# Patient Record
Sex: FEMALE | Race: WHITE | HISPANIC OR LATINO | Employment: FULL TIME | URBAN - METROPOLITAN AREA
[De-identification: names, ages, dates, MRNs, and addresses within clinical notes are randomized per-mention and may not be internally consistent; named-entity substitution may affect disease eponyms.]

---

## 2024-08-09 ENCOUNTER — INITIAL PRENATAL (OUTPATIENT)
Dept: OBSTETRICS AND GYNECOLOGY | Facility: CLINIC | Age: 24
End: 2024-08-09
Payer: COMMERCIAL

## 2024-08-09 VITALS — WEIGHT: 130 LBS | DIASTOLIC BLOOD PRESSURE: 61 MMHG | SYSTOLIC BLOOD PRESSURE: 108 MMHG

## 2024-08-09 DIAGNOSIS — Z11.3 SCREEN FOR SEXUALLY TRANSMITTED DISEASES: ICD-10-CM

## 2024-08-09 DIAGNOSIS — N91.2 ABSENT MENSES: Primary | ICD-10-CM

## 2024-08-09 DIAGNOSIS — Z34.00 SUPERVISION OF NORMAL FIRST PREGNANCY, ANTEPARTUM: ICD-10-CM

## 2024-08-09 DIAGNOSIS — Z12.4 SCREENING FOR MALIGNANT NEOPLASM OF CERVIX: ICD-10-CM

## 2024-08-09 DIAGNOSIS — Z31.49 PROCREATION MANAGEMENT INVESTIGATION AND TESTING: ICD-10-CM

## 2024-08-09 LAB
B-HCG UR QL: POSITIVE
EXPIRATION DATE: ABNORMAL
INTERNAL NEGATIVE CONTROL: NEGATIVE
INTERNAL POSITIVE CONTROL: POSITIVE
Lab: ABNORMAL

## 2024-08-09 RX ORDER — PNV NO.95/FERROUS FUM/FOLIC AC 28MG-0.8MG
1 TABLET ORAL DAILY
Qty: 30 TABLET | Refills: 11 | Status: SHIPPED | OUTPATIENT
Start: 2024-08-09 | End: 2024-09-08

## 2024-08-10 LAB
AMPHETAMINES UR QL SCN: NEGATIVE NG/ML
BARBITURATES UR QL SCN: NEGATIVE NG/ML
BENZODIAZ UR QL SCN: NEGATIVE NG/ML
BZE UR QL SCN: NEGATIVE NG/ML
CANNABINOIDS UR QL SCN: NEGATIVE NG/ML
CREAT UR-MCNC: 96.4 MG/DL (ref 20–300)
LABORATORY COMMENT REPORT: NORMAL
METHADONE UR QL SCN: NEGATIVE NG/ML
OPIATES UR QL SCN: NEGATIVE NG/ML
OXYCODONE+OXYMORPHONE UR QL SCN: NEGATIVE NG/ML
PCP UR QL: NEGATIVE NG/ML
PH UR: 5.8 [PH] (ref 4.5–8.9)
PROPOXYPH UR QL SCN: NEGATIVE NG/ML

## 2024-08-11 LAB
BACTERIA UR CULT: NO GROWTH
BACTERIA UR CULT: NORMAL

## 2024-08-12 LAB
ABO GROUP BLD: NORMAL
BASOPHILS # BLD AUTO: 0 X10E3/UL (ref 0–0.2)
BASOPHILS NFR BLD AUTO: 0 %
BLD GP AB SCN SERPL QL: NEGATIVE
EOSINOPHIL # BLD AUTO: 0.1 X10E3/UL (ref 0–0.4)
EOSINOPHIL NFR BLD AUTO: 1 %
ERYTHROCYTE [DISTWIDTH] IN BLOOD BY AUTOMATED COUNT: 12.2 % (ref 11.7–15.4)
HBV SURFACE AG SERPL QL IA: NEGATIVE
HCT VFR BLD AUTO: 38 % (ref 34–46.6)
HCV AB SERPL QL IA: NORMAL
HCV IGG SERPL QL IA: NON REACTIVE
HGB A MFR BLD ELPH: 97.5 % (ref 96.4–98.8)
HGB A2 MFR BLD ELPH: 2.5 % (ref 1.8–3.2)
HGB BLD-MCNC: 12.6 G/DL (ref 11.1–15.9)
HGB F MFR BLD ELPH: 0 % (ref 0–2)
HGB FRACT BLD-IMP: NORMAL
HGB S MFR BLD ELPH: 0 %
HIV 1+2 AB+HIV1 P24 AG SERPL QL IA: NON REACTIVE
IMM GRANULOCYTES # BLD AUTO: 0 X10E3/UL (ref 0–0.1)
IMM GRANULOCYTES NFR BLD AUTO: 0 %
LYMPHOCYTES # BLD AUTO: 2.6 X10E3/UL (ref 0.7–3.1)
LYMPHOCYTES NFR BLD AUTO: 25 %
MCH RBC QN AUTO: 29.9 PG (ref 26.6–33)
MCHC RBC AUTO-ENTMCNC: 33.2 G/DL (ref 31.5–35.7)
MCV RBC AUTO: 90 FL (ref 79–97)
MONOCYTES # BLD AUTO: 0.7 X10E3/UL (ref 0.1–0.9)
MONOCYTES NFR BLD AUTO: 6 %
NEUTROPHILS # BLD AUTO: 7 X10E3/UL (ref 1.4–7)
NEUTROPHILS NFR BLD AUTO: 68 %
PLATELET # BLD AUTO: 338 X10E3/UL (ref 150–450)
RBC # BLD AUTO: 4.21 X10E6/UL (ref 3.77–5.28)
RH BLD: POSITIVE
RUBV IGG SERPL IA-ACNC: 1.03 INDEX
TREPONEMA PALLIDUM IGG+IGM AB [PRESENCE] IN SERUM OR PLASMA BY IMMUNOASSAY: NON REACTIVE
WBC # BLD AUTO: 10.4 X10E3/UL (ref 3.4–10.8)

## 2024-08-12 NOTE — PROGRESS NOTES
"Chief Complaint  Initial Prenatal Visit (LMP sometime in May?)    Subjective        Koko Aguilar presents to [facility]  History of Present Illness  Patient here for initial prenatal visit.  She has uncertain last menstrual period.  She says was \"sometime in May\" but cannot narrow down any further than that.  She denies recent vaginal bleeding.  She denies significant abdominal or pelvic pain.  She is having some mild nausea but no emesis.    Menstrual History:  OB History          2    Para        Term                AB   1    Living             SAB   1    IAB        Ectopic        Molar        Multiple        Live Births                     Patient's last menstrual period was 2024 (approximate).     History reviewed. No pertinent past medical history.    History reviewed. No pertinent surgical history.    Social History     Tobacco Use    Smokeless tobacco: Never   Substance Use Topics    Alcohol use: Never    Drug use: Never     Family History   Problem Relation Age of Onset    Breast cancer Neg Hx     Ovarian cancer Neg Hx     Uterine cancer Neg Hx     Colon cancer Neg Hx      No current outpatient medications on file prior to visit.     No current facility-administered medications on file prior to visit.     No Known Allergies    Review of systems:  Constitutional: No fevers, chills, sweats   Eye: No recent visual problems, denies blurry vision   HEENT: No ear pain, nasal congestion, sore throat, voice changes   Respiratory: No shortness of breath, cough, pain on breathing   Cardiovascular: No Chest pain, palpitations   Gastrointestinal: No nausea, vomiting, diarrhea, constipation   Genitourinary: No hematuria, dysuria, lesions on genitalia   Hema/Lymph: Negative for bruising, no edema   Endocrine: Negative for excessive thirst, excessive hunger, heat or cold intolerance   Musculoskeletal: No joint pain, muscle pain, decreased range of motion "   Integumentary: No rash, pruritus, abrasions, lesions   Neurologic: No weakness, numbness, headaches   Psychiatric: No anxiety, depression, mood changes       Objective   Vital Signs:  /61   Wt 59 kg (130 lb)   There is no height or weight on file to calculate BMI.             Physical Exam   Exam performed in the presence of a female chaperone  Patient has provided verbal consent to proceed with exam.    Gen: No acute distress, awake and oriented times three  HENT: Normocephalic, atraumatic, Moist mucous membranes  Eyes: PERRLA, EOMI  Lungs: Normal work of breathing, lungs clear bilaterally  Abdomen: soft, nontender, no masses or hernia, non distended, normoactive bowel sounds, fetal heart tones 160 bpm  Normal external female genitalia, no lesions  Urethra: Normal meatus, no caruncle  Bladder: nontender  Vagina: No blood or discharge  Cervix: No cervical motion tenderness, no lesions, no active bleeding, nonfriable  Uterus: Anteverted, about 10-12 weeks size, nontender  Adnexa: No masses or tenderness  External anal exam: Normal appearance, no lesions or hemorrhoids  Rectal: Deferred  Skin: Warm and dry, no rashes  Psych: Good judgement and insight, normal affect and mood  Neuro: CN 2-12 intact, no gross deficits    Result Review :            Component  Ref Range & Units today   HCG, Urine, QL  Negative Positive Abnormal    Lot Number [id number]   Internal Positive Control  Positive, Passed Positive   Internal Negative Control  Negative, Passed Negative       Ultrasound today:  Findings:  There is a live single intrauterine pregnancy identified.  Intrauterine gestational sac is seen with a normal-appearing yolk sac.  There is a single fetal pole identified with a crown-rump length of 4.71 cm, consistent with 11 weeks and 4 days  Fetal cardiac activity is detected measuring 163 bpm    Left ovary: 5.31 x 4.49 x 4.86 cm, volume 60.670 cm cube  There is a simple thin-walled cyst measuring 3.8 x 3.7 cm  noted    Right ovary: 2.45 x 1.74 x 1.96 cm, volume 4.375 cm cube  There is no adnexal mass or cyst identified.    There is no free fluid.      Impression:  Live single uterine pregnancy measuring 11 weeks and 4 days today.  Small simple left ovarian cyst noted.           Assessment and Plan     Diagnoses and all orders for this visit:    1. Absent menses (Primary)  -     POC Pregnancy, Urine    2. Supervision of normal first pregnancy, antepartum  -     OB Panel With HIV and Treponema Palldium Ab  -     Hemoglobinopathy Fractionation Cascade  -     Urine Culture - Urine, Urine, Clean Catch  -     Urine Drug Screen - Urine, Clean Catch  -     Prenatal Vit-Fe Fumarate-FA (Prenatal Vitamin) 27-0.8 MG tablet; Take 1 tablet by mouth Daily for 30 days.  Dispense: 30 tablet; Refill: 11  -     Inheritest Core Panel (CF97,SMA,FraX) - Blood,  -     [id number] PLUS Core+SCA+ESS - Blood,  -     IGP,CtNgTv,rfx Apt HPV All    3. Screening for malignant neoplasm of cervix  -     IGP,CtNgTv,rfx Apt HPV All    4. Screen for sexually transmitted diseases  -     OB Panel With HIV and Treponema Palldium Ab  -     IGP,CtNgTv,rfx Apt HPV All    5. Procreation management investigation and testing  -     OB Panel With HIV and Treponema Palldium Ab  -     Hemoglobinopathy Fractionation Cascade  -     Inheritest Core Panel (CF97,SMA,FraX) - Blood,  -     [id number] PLUS Core+SCA+ESS - Blood,    Initial prenatal counseling performed today. Educational handouts on prenatal care provided to patient. Discussed frequency of visits, lab testing, OTC medications, physical activity, exercise, dietary restrictions, potential exposures (COVID, Zika, Toxo, etc). Hospital and call coverage system discussed. Pt is recommended to start PNV.     Routine labs today with Pap, cultures, and OB profile panel.  We discussed screening options for aneuploidy.  The risk, benefits, alternatives, limitations of testing were explained.  She verbalized understanding  and wishes to proceed with NIPT today.    Return to the office in 4 weeks           Follow Up     Return in about 4 weeks (around 9/6/2024), or OB FU.  Patient was given instructions and counseling regarding her condition or for health maintenance advice. Please see specific information pulled into the AVS if appropriate.

## 2024-08-14 LAB
C TRACH RRNA CVX QL NAA+PROBE: NEGATIVE
CONV .: NORMAL
CYTOLOGIST CVX/VAG CYTO: NORMAL
CYTOLOGY CVX/VAG DOC CYTO: NORMAL
CYTOLOGY CVX/VAG DOC THIN PREP: NORMAL
DX ICD CODE: NORMAL
Lab: NORMAL
N GONORRHOEA RRNA CVX QL NAA+PROBE: NEGATIVE
OTHER STN SPEC: NORMAL
STAT OF ADQ CVX/VAG CYTO-IMP: NORMAL
T VAGINALIS RRNA SPEC QL NAA+PROBE: NEGATIVE

## 2024-08-20 ENCOUNTER — TELEPHONE (OUTPATIENT)
Dept: OBSTETRICS AND GYNECOLOGY | Facility: CLINIC | Age: 24
End: 2024-08-20
Payer: COMMERCIAL

## 2024-08-20 LAB
CITATION REF LAB TEST: ABNORMAL
CLINICAL INFO: ABNORMAL
GENDER IDENTITY: ABNORMAL
GENE DIS ANL CARRIER INTERP-IMP: ABNORMAL
GENE STUDIED ID: ABNORMAL
GENETIC SCN SPEC: ABNORMAL
LAB DIRECTOR NAME PROVIDER: ABNORMAL
Lab: ABNORMAL
REASON FOR REFERRAL (NARRATIVE): ABNORMAL
RECOMMENDATION PATIENT DOC-IMP: ABNORMAL
REF LAB TEST METHOD: ABNORMAL
SERVICE CMNT-IMP: ABNORMAL
SPECIMEN SOURCE: ABNORMAL

## 2024-08-20 NOTE — TELEPHONE ENCOUNTER
Pt aware of results. Pt wants envelope with gender for a reveal         [id number] (fetal chromosome test) was normal. Baby is a girl.

## 2024-09-06 ENCOUNTER — ROUTINE PRENATAL (OUTPATIENT)
Dept: OBSTETRICS AND GYNECOLOGY | Facility: CLINIC | Age: 24
End: 2024-09-06
Payer: COMMERCIAL

## 2024-09-06 VITALS — WEIGHT: 133.8 LBS | DIASTOLIC BLOOD PRESSURE: 63 MMHG | SYSTOLIC BLOOD PRESSURE: 101 MMHG

## 2024-09-06 DIAGNOSIS — O09.899 SUPERVISION OF OTHER HIGH RISK PREGNANCY, ANTEPARTUM: ICD-10-CM

## 2024-09-06 DIAGNOSIS — Z13.89 SCREENING FOR BLOOD OR PROTEIN IN URINE: Primary | ICD-10-CM

## 2024-09-06 DIAGNOSIS — Z15.89 MONOALLELIC MUTATION OF SMN1 GENE: ICD-10-CM

## 2024-09-06 LAB
BILIRUB BLD-MCNC: NEGATIVE MG/DL
GLUCOSE UR STRIP-MCNC: NEGATIVE MG/DL
KETONES UR QL: NEGATIVE
LEUKOCYTE EST, POC: ABNORMAL
NITRITE UR-MCNC: NEGATIVE MG/ML
PH UR: 7 [PH] (ref 5–8)
PROT UR STRIP-MCNC: NEGATIVE MG/DL
RBC # UR STRIP: NEGATIVE /UL
SP GR UR: 1.02 (ref 1–1.03)
UROBILINOGEN UR QL: ABNORMAL

## 2024-09-06 NOTE — PROGRESS NOTES
Entirety of today's encounter including patient interview, exam, and counseling performed with the aid of a medical  via the telephone.     Chief complaint: Patient here for routine OB visit.    History of present illness: No major complaints at this time.  She denies contractions, vaginal bleeding, leakage of fluid.  She reports active fetal movement.    Objective: See vital signs in the flowsheet  General: No acute distress, awake and oriented x3  Abdomen: Soft, nontender, gravid, fetal heart tones 150  Extremities: No lower extremity edema, no calf tenderness  Psychiatric: Good judgment insight, normal affect and mood  Neurologic: Cranial nerves II through XII intact, no gross deficits    Routine Prenatal on 09/06/2024   Component Date Value Ref Range Status    Glucose, UA 09/06/2024 Negative  Negative mg/dL Final    Bilirubin 09/06/2024 Negative  Negative Final    Ketones, UA 09/06/2024 Negative  Negative Final    Specific Gravity  09/06/2024 1.025  1.005 - 1.030 Final    Blood, UA 09/06/2024 Negative  Negative Final    pH, Urine 09/06/2024 7.0  5.0 - 8.0 Final    Protein, POC 09/06/2024 Negative  Negative mg/dL Final    Urobilinogen, UA 09/06/2024 0.2 E.U./dL  Normal, 0.2 E.U./dL Final    Leukocytes 09/06/2024 Trace (A)  Negative Final    Nitrite, UA 09/06/2024 Negative  Negative Final   Initial Prenatal on 08/09/2024   Component Date Value Ref Range Status    HCG, Urine, QL 08/09/2024 Positive (A)  Negative Final    Lot Number 08/09/2024 [id number]   Final    Internal Positive Control 08/09/2024 Positive  Positive, Passed Final    Internal Negative Control 08/09/2024 Negative  Negative, Passed Final    Expiration Date 08/09/2024 5,082,025   Final    Hepatitis B Surface Ag 08/09/2024 Negative  Negative Final    Hepatitis C Ab 08/09/2024 Non Reactive  Non Reactive Final    T pallidum Antibodies 08/09/2024 Non Reactive  Non Reactive Final    Rubella Antibodies, IgG 08/09/2024 1.03  Immune >0.99  index Final    Comment:                                 Non-immune       <0.90                                  Equivocal  0.90 - 0.99                                  Immune           >0.99      ABO Type 08/09/2024 O   Final    Rh Factor 08/09/2024 Positive   Final    Comment: Please note: Prior records for this patient's ABO / Rh type are not  available for additional verification.      Antibody Screen 08/09/2024 Negative  Negative Final    HIV Screen 4th Gen w/RFX (Referenc* 08/09/2024 Non Reactive  Non Reactive Final    Comment: HIV Negative  HIV-1/HIV-2 antibodies and HIV-1 p24 antigen were NOT detected.  There is no laboratory evidence of HIV infection.      WBC 08/09/2024 10.4  3.4 - 10.8 x10E3/uL Final    RBC 08/09/2024 4.21  3.77 - 5.28 x10E6/uL Final    Hemoglobin 08/09/2024 12.6  11.1 - 15.9 g/dL Final    Hematocrit 08/09/2024 38.0  34.0 - 46.6 % Final    MCV 08/09/2024 90  79 - 97 fL Final    MCH 08/09/2024 29.9  26.6 - 33.0 pg Final    MCHC 08/09/2024 33.2  31.5 - 35.7 g/dL Final    RDW 08/09/2024 12.2  11.7 - 15.4 % Final    Platelets 08/09/2024 338  150 - 450 x10E3/uL Final    Neutrophil Rel % 08/09/2024 68  Not Estab. % Final    Lymphocyte Rel % 08/09/2024 25  Not Estab. % Final    Monocyte Rel % 08/09/2024 6  Not Estab. % Final    Eosinophil Rel % 08/09/2024 1  Not Estab. % Final    Basophil Rel % 08/09/2024 0  Not Estab. % Final    Neutrophils Absolute 08/09/2024 7.0  1.4 - 7.0 x10E3/uL Final    Lymphocytes Absolute 08/09/2024 2.6  0.7 - 3.1 x10E3/uL Final    Monocytes Absolute 08/09/2024 0.7  0.1 - 0.9 x10E3/uL Final    Eosinophils Absolute 08/09/2024 0.1  0.0 - 0.4 x10E3/uL Final    Basophils Absolute 08/09/2024 0.0  0.0 - 0.2 x10E3/uL Final    Immature Granulocyte Rel % 08/09/2024 0  Not Estab. % Final    Immature Grans Absolute 08/09/2024 0.0  0.0 - 0.1 x10E3/uL Final    Hgb F Quant 08/09/2024 0.0  0.0 - 2.0 % Final    Hgb A 08/09/2024 97.5  96.4 - 98.8 % Final    Hgb A2 Quant 08/09/2024 2.5   1.8 - 3.2 % Final    Hgb S 08/09/2024 0.0  0.0 % Final    Hgb Interp. 08/09/2024 Comment   Final    Comment: Normal hemoglobin present; no hemoglobin variant or beta thalassemia  identified.  Note: Alpha thalassemia may not be detected by the Hgb Fractionation  Cascade panel. If alpha thalassemia is suspected, [facility] offers  Alpha-Thalassemia DNA Analysis (#[id number]).      Urine Culture 08/09/2024 Final report   Final    Result 1 08/09/2024 No growth   Final    Amphetamine, Urine Qual 08/09/2024 Negative  Jqjxdw=0981 ng/mL Final    Barbiturates Screen, Urine 08/09/2024 Negative  Tyuycp=754 ng/mL Final    Benzodiazepine Screen, Urine 08/09/2024 Negative  Aetkim=585 ng/mL Final    THC Screen, Urine 08/09/2024 Negative  Cutoff=20 ng/mL Final    Cocaine Screen, Urine 08/09/2024 Negative  Adodni=358 ng/mL Final    Opiate Screen, Urine 08/09/2024 Negative  Zruuve=627 ng/mL Final    Opiate test includes Codeine, Morphine, Hydromorphone, Hydrocodone.    Oxycodone/Oxymorphone, Urine 08/09/2024 Negative  Vzgesr=862 ng/mL Final    Test includes Oxycodone and Oxymorphone    Phencyclidine (PCP), Urine 08/09/2024 Negative  Cutoff=25 ng/mL Final    Methadone Screen, Urine 08/09/2024 Negative  Zkcgbf=027 ng/mL Final    Propoxyphene Screen 08/09/2024 Negative  Avvtrj=806 ng/mL Final    Creatinine, Urine 08/09/2024 96.4  20.0 - 300.0 mg/dL Final    pH, UA 08/09/2024 5.8  4.5 - 8.9 Final    Please note 08/09/2024 Comment   Final    C    Genes 08/09/2024 Comment   Final    3 genes    Ethnicity 08/09/2024 Comment   Final    Not Provided    Specimen Type: 08/09/2024 Comment   Final    Whole Blood    Indication: 08/09/2024 Comment   Final    Carrier Test / Screening    Result 08/09/2024 Comment (A)   Final    POSITIVE    Interpretation 08/09/2024 Comment   Final    Comment: Variants Detected  Disorders (Gene)   Result           Interpretation  Spinal muscular    AT RISK TO BE    At risk to be a  atrophy (SMN1)     A CARRIER : 2    silent  carrier  NM_000344.4        copies of        (2+0). Risk: MAY BE                     SMN1;            AT INCREASED RISK                     c.*3+80T>G       FOR AFFECTED                     risk variant     PREGNANCY. Genetic                     present.         counseling and                                      reproductive partner                                      carrier screening is                                      recommended.  Negative Results  Disorders (Gene)    Result         Interpretation  Cystic fibrosis     NEGATIVE       This result reduces,  (CFTR)                             but does not  NM_000492.4                        eliminate, the risk                                     to be a carrier.                                     Risk: NOT at an                                     increased risk fo                           r an                                     affected pregnancy.  Fragile X           NEGATIVE :     Not a carrier of a  syndrome (FMR1)     PCR repeats:   fragile X expansion.  NM_[id number]         21 and 39      Risk: NOT at an                                     increased risk for an                                     affected pregnancy.      RECOMMENDATIONS 08/09/2024 Comment   Final                                                                                                    Patient Gender 08/09/2024 Female   Final    Gestation 08/09/2024 Bose   Final    Fetal Fraction 08/09/2024 8%   Final    Gestational Age >9: 08/09/2024 Yes   Final    Result 08/09/2024 Negative   Final     Comments 08/09/2024 Comment   Final    Comment: This specimen showed an expected representation of  chromosome 21, 18 and 13 material. Clinical correlation is  suggested.      Approved By 08/09/2024 Comment   Final    Jordan Montaño MD, PhD, Director, [facility]    TRISOMY 21 (DOWN SYNDROME) 08/09/2024 Negative   Final    TRISOMY 18 (LAWRENCE SYNDROME) 08/09/2024  Negative   Final    TRISOMY 13 (PATAU SYNDROME) 08/09/2024 Negative   Final    FETAL SEX 08/09/2024 Comment   Final    Consistent with Female    MONOSOMY X (HAMPTON SYNDROME) 08/09/2024 Not Detected   Final    XYY (MAHARAJ SYNDROME) 08/09/2024 Not Detected   Final    XXY (KLINEFELTER SYNDROME) 08/09/2024 Not Detected   Final    XXX (TRIPLE X SYNDROME) 08/09/2024 Not Detected   Final    22Q11 DELETION (DIGEORGE) 08/09/2024 Not Detected   Final    15Q11 DELETION (PW ANGELMAN) 08/09/2024 Not Detected   Final    11Q23 DELETION (MARCELL) 08/09/2024 Not Detected   Final    8Q24 DELETION (ELOISA-GIEDION) 08/09/2024 Not Detected   Final    5P15 DELETION (Cri-du-chat) 08/09/2024 Not Detected   Final    4P16 DELETION (IBRAHIM-HIRSCHHORN) 08/09/2024 Not Detected   Final    1P36 DELETION SYNDROME 08/09/2024 Not Detected   Final    TRIOSOMY 16 08/09/2024 Not Detected   Final    TRISOMY 22 08/09/2024 Not Detected   Final                                                                                                                        Diagnosis 08/09/2024 Comment   Final    NEGATIVE FOR INTRAEPITHELIAL LESION OR MALIGNANCY.    Specimen adequacy: 08/09/2024 Comment   Final    Comment: Satisfactory for evaluation.  No endocervical component is identified.  An endocervical component is not commonly seen in the pregnant patient.      Clinician Provided ICD-10: 08/09/2024 Comment   Final    Comment: Z34.00  Z12.4  Z11.3      Performed by: 08/09/2024 Comment   Final    Shivani Ramirez, Cytotechnologist (ASCP)    . 08/09/2024 .   Final    Note: 08/09/2024 Comment   Final    Comment: The Pap smear is a screening test designed to aid in the detection of  premalignant and malignant conditions of the uterine cervix.  It is not a  diagnostic procedure and should not be used as the sole means of detecting  cervical cancer.  Both false-positive and false-negative reports do occur.      Method: 08/09/2024 Comment   Final    Comment: This liquid  based ThinPrep(R) pap test was screened with the  use of an image guided system.      Conv .conv 2024 Comment   Final    Comment: The HPV DNA reflex criteria were not met with this specimen result  therefore, no HPV testing was performed.      Chlamydia, Nuc. Acid Amp 2024 Negative  Negative Final    Gonococcus by Nucleic Acid Amp 2024 Negative  Negative Final    Trichomonas vaginosis 2024 Negative  Negative Final                     Assessment:  1.  23-year-old  2 para 0 at 15-4/7 weeks gestational age  2. SMA carrier    Plan:  1.  We discussed recent lab work with the patient.  Limitations of the testing were explained.  We discussed findings related to SMA.  Explained the genetics of this disease process.  Recommended screening of the patient's partner.  They wish for him to be tested today.  Her partner,Maxwell Caldwell, have lab work done today.  Will notify them of the results.  2.  Otherwise doing well.  Plan return to the office in 4 weeks with ultrasound for anatomy at that time.

## 2024-09-13 ENCOUNTER — TELEPHONE (OUTPATIENT)
Dept: OBSTETRICS AND GYNECOLOGY | Facility: CLINIC | Age: 24
End: 2024-09-13
Payer: COMMERCIAL

## 2024-09-13 NOTE — TELEPHONE ENCOUNTER
----- Message from Destiny DAWKINS sent at 9/13/2024  3:40 PM EDT -----  Regarding: Randa gonzalez   Contact: [phone]  [id number]. OB & US 10/11/24, seen 9/6/24. Translation    If my boyfriend's test came back well, I don't have to worry about the test that gave me a positive   Thank you.      ----- Message -----  From: Koko Aguilar  Sent: 9/13/2024   2:47 PM EDT  To: [facility]  Subject: Randa gonzalez                                     Si la prueba de mi novio salio job no tengo que preocuparme por el análisis q me juan luis positivo

## 2024-09-13 NOTE — TELEPHONE ENCOUNTER
La paciente es portadora de SMN, chetna ahora muestra que alva christianne es negativa.  Wilfrido susi, carola tiene razón, no necesita preocuparse por esta condición en chino momento. La NATE es autosómica recesiva, lo que significa que ambos padres tienen que ser portadores para que el bebé se shaheed afectado.  Wilfrido susi, alva bebé no puede verse afectado por esta condición. My Chart response to 9/13/24 My Chart message.   Detail Level: Zone Patient Specific Otc Recommendations (Will Not Stick From Patient To Patient): Continue with Wart stick with duct tape for 24hrs at a time

## 2024-10-16 ENCOUNTER — ROUTINE PRENATAL (OUTPATIENT)
Dept: OBSTETRICS AND GYNECOLOGY | Facility: CLINIC | Age: 24
End: 2024-10-16
Payer: COMMERCIAL

## 2024-10-16 VITALS — WEIGHT: 128.4 LBS | SYSTOLIC BLOOD PRESSURE: 104 MMHG | DIASTOLIC BLOOD PRESSURE: 50 MMHG

## 2024-10-16 DIAGNOSIS — Z3A.21 21 WEEKS GESTATION OF PREGNANCY: Primary | ICD-10-CM

## 2024-10-16 LAB
GLUCOSE UR STRIP-MCNC: NEGATIVE MG/DL
PROT UR STRIP-MCNC: NEGATIVE MG/DL

## 2024-10-16 RX ORDER — PRENATAL VIT/IRON FUM/FOLIC AC 27MG-0.8MG
1 TABLET ORAL DAILY
COMMUNITY
Start: 2024-10-05

## 2024-10-16 NOTE — PROGRESS NOTES
Entirety of today's encounter including patient interview, exam, and counseling performed with the aid of a medical  via the telephone.     Chief complaint: Patient here for routine OB visit.    History of present illness: No major complaints at this time.  She denies contractions, vaginal bleeding, leakage of fluid.  She reports active fetal movement.  She does report some occasional episodes of stress urinary incontinence, mostly when she sneezes.  She denies dysuria    Objective: See vital signs in the flowsheet  General: No acute distress, awake and oriented x3  Abdomen: Soft, nontender, gravid, fetal heart tones 136  Extremities: No lower extremity edema, no calf tenderness  Psychiatric: Good judgment insight, normal affect and mood  Neurologic: Cranial nerves II through XII intact, no gross deficits    Ultrasound today:   Appropriate growth for gestational age  Normal anatomic survey, anatomic survey is complete  Normal amniotic fluid  Normal cervical length  Anterior placenta with no previa    Assessment:  1.  23-year-old  2 para 0 at 21-2/7 weeks gestational age  2. SMA carrier, partner is negative    Plan:  1.  Ultrasound findings of today were discussed with the patient.  Limitations of ultrasound were explained.  2.  Reassurance offered regarding episodes of stress urinary continence.  Explained this is common in pregnancy and typically resolve spontaneously after delivery.  We discussed options for treatment in the event that symptoms do not resolve.  All questions answered.  3.  Return to the office in 4 weeks.

## 2024-11-13 ENCOUNTER — ROUTINE PRENATAL (OUTPATIENT)
Dept: OBSTETRICS AND GYNECOLOGY | Facility: CLINIC | Age: 24
End: 2024-11-13
Payer: COMMERCIAL

## 2024-11-13 VITALS — WEIGHT: 143.2 LBS | DIASTOLIC BLOOD PRESSURE: 54 MMHG | SYSTOLIC BLOOD PRESSURE: 96 MMHG

## 2024-11-13 DIAGNOSIS — Z3A.25 25 WEEKS GESTATION OF PREGNANCY: Primary | ICD-10-CM

## 2024-11-13 DIAGNOSIS — Z11.3 SCREEN FOR SEXUALLY TRANSMITTED DISEASES: ICD-10-CM

## 2024-11-13 DIAGNOSIS — Z15.89 MONOALLELIC MUTATION OF SMN1 GENE: ICD-10-CM

## 2024-11-13 DIAGNOSIS — O09.899 SUPERVISION OF OTHER HIGH RISK PREGNANCY, ANTEPARTUM: ICD-10-CM

## 2024-11-13 LAB
GLUCOSE UR STRIP-MCNC: NEGATIVE MG/DL
PROT UR STRIP-MCNC: NEGATIVE MG/DL

## 2024-11-13 NOTE — PROGRESS NOTES
Entirety of today's encounter including patient interview, exam, and counseling performed with the aid of a medical  via the telephone.     Chief complaint: Patient here for routine OB visit.    History of present illness: No major complaints at this time.  She denies contractions, vaginal bleeding, leakage of fluid.  She reports active fetal movement.    Objective: See vital signs in the flowsheet  General: No acute distress, awake and oriented x3  Abdomen: Soft, nontender, gravid, fetal heart tones 145, fundal height 25 cm  Extremities: No lower extremity edema, no calf tenderness  Psychiatric: Good judgment insight, normal affect and mood  Neurologic: Cranial nerves II through XII intact, no gross deficits    Assessment:  1.  24-year-old  2 para 0 at 25-2/7 weeks gestational age  2.  SMA carrier, father baby is not a carrier    Plan:  1.  Routine prenatal care.  Return to the office in 3 weeks for next appointment.  28-week labs at next visit.

## 2024-12-05 ENCOUNTER — ROUTINE PRENATAL (OUTPATIENT)
Dept: OBSTETRICS AND GYNECOLOGY | Facility: CLINIC | Age: 24
End: 2024-12-05
Payer: COMMERCIAL

## 2024-12-05 VITALS — WEIGHT: 145.6 LBS | DIASTOLIC BLOOD PRESSURE: 75 MMHG | SYSTOLIC BLOOD PRESSURE: 113 MMHG

## 2024-12-05 DIAGNOSIS — Z11.3 SCREEN FOR SEXUALLY TRANSMITTED DISEASES: ICD-10-CM

## 2024-12-05 DIAGNOSIS — Z15.89 MONOALLELIC MUTATION OF SMN1 GENE: ICD-10-CM

## 2024-12-05 DIAGNOSIS — Z23 NEED FOR DIPHTHERIA-TETANUS-PERTUSSIS (TDAP) VACCINE: ICD-10-CM

## 2024-12-05 DIAGNOSIS — O09.899 SUPERVISION OF OTHER HIGH RISK PREGNANCY, ANTEPARTUM: ICD-10-CM

## 2024-12-05 DIAGNOSIS — Z3A.28 28 WEEKS GESTATION OF PREGNANCY: Primary | ICD-10-CM

## 2024-12-05 DIAGNOSIS — Z23 NEEDS FLU SHOT: ICD-10-CM

## 2024-12-05 LAB
GLUCOSE UR STRIP-MCNC: NEGATIVE MG/DL
PROT UR STRIP-MCNC: NEGATIVE MG/DL

## 2024-12-05 NOTE — PROGRESS NOTES
Entirety of today's encounter including patient interview, exam, and counseling performed with the aid of a medical  via the telephone.     Chief complaint: Patient here for routine OB visit.    History of present illness: No major complaints at this time.  She denies contractions, vaginal bleeding, leakage of fluid.  She reports active fetal movement.  Patient reports noticing indigestion and slow gastric emptying.  Reports that she feels full after even a small meal.    Objective: See vital signs in the flowsheet  General: No acute distress, awake and oriented x3  Abdomen: Soft, nontender, gravid, fetal heart tones 130, fundal height 28 cm  Extremities: No lower extremity edema, no calf tenderness  Psychiatric: Good judgment insight, normal affect and mood  Neurologic: Cranial nerves II through XII intact, no gross deficits      Assessment:  1.  24  2 para 0 at 28-3/7 weeks gestational age  2.  SMA carrier  3.  Need for Tdap and flu vaccine    Plan:  1.  We discussed dietary and lifestyle modifications to try to help with indigestion/slow gastric emptying.  Reassurance offered.  2.  28-week labs today.  3.  Recommendations made for Tdap and flu vaccine today.  Risks and benefits discussed.  Patient wishes to proceed.  4.  Return to the office as scheduled in 2-3 weeks.  Ultrasound for growth in 4 weeks.

## 2024-12-05 NOTE — PROGRESS NOTES
Pt received the TDaP and Flu vaccines today, office supplied. Pt received the injections in the left deltoid and tolerated well with no reaction.

## 2024-12-06 LAB
BASOPHILS # BLD AUTO: 0.03 10*3/MM3 (ref 0–0.2)
BASOPHILS NFR BLD AUTO: 0.3 % (ref 0–1.5)
EOSINOPHIL # BLD AUTO: 0.09 10*3/MM3 (ref 0–0.4)
EOSINOPHIL NFR BLD AUTO: 0.8 % (ref 0.3–6.2)
ERYTHROCYTE [DISTWIDTH] IN BLOOD BY AUTOMATED COUNT: 12.6 % (ref 12.3–15.4)
FERRITIN SERPL-MCNC: 24.6 NG/ML (ref 13–150)
GLUCOSE 1H P 50 G GLC PO SERPL-MCNC: 126 MG/DL (ref 65–139)
HCT VFR BLD AUTO: 36.9 % (ref 34–46.6)
HGB BLD-MCNC: 12.7 G/DL (ref 12–15.9)
HIV 1+2 AB+HIV1 P24 AG SERPL QL IA: NON REACTIVE
IMM GRANULOCYTES # BLD AUTO: 0.17 10*3/MM3 (ref 0–0.05)
IMM GRANULOCYTES NFR BLD AUTO: 1.6 % (ref 0–0.5)
LYMPHOCYTES # BLD AUTO: 2.23 10*3/MM3 (ref 0.7–3.1)
LYMPHOCYTES NFR BLD AUTO: 20.5 % (ref 19.6–45.3)
MCH RBC QN AUTO: 30.5 PG (ref 26.6–33)
MCHC RBC AUTO-ENTMCNC: 34.4 G/DL (ref 31.5–35.7)
MCV RBC AUTO: 88.5 FL (ref 79–97)
MONOCYTES # BLD AUTO: 0.63 10*3/MM3 (ref 0.1–0.9)
MONOCYTES NFR BLD AUTO: 5.8 % (ref 5–12)
NEUTROPHILS # BLD AUTO: 7.75 10*3/MM3 (ref 1.7–7)
NEUTROPHILS NFR BLD AUTO: 71 % (ref 42.7–76)
NRBC BLD AUTO-RTO: 0 /100 WBC (ref 0–0.2)
PLATELET # BLD AUTO: 370 10*3/MM3 (ref 140–450)
RBC # BLD AUTO: 4.17 10*6/MM3 (ref 3.77–5.28)
TREPONEMA PALLIDUM IGG+IGM AB [PRESENCE] IN SERUM OR PLASMA BY IMMUNOASSAY: NON REACTIVE
WBC # BLD AUTO: 10.9 10*3/MM3 (ref 3.4–10.8)

## 2025-01-03 ENCOUNTER — ROUTINE PRENATAL (OUTPATIENT)
Dept: OBSTETRICS AND GYNECOLOGY | Facility: CLINIC | Age: 25
End: 2025-01-03
Payer: COMMERCIAL

## 2025-01-03 VITALS — WEIGHT: 151.4 LBS | DIASTOLIC BLOOD PRESSURE: 62 MMHG | SYSTOLIC BLOOD PRESSURE: 103 MMHG

## 2025-01-03 DIAGNOSIS — Z3A.32 32 WEEKS GESTATION OF PREGNANCY: Primary | ICD-10-CM

## 2025-01-03 LAB
GLUCOSE UR STRIP-MCNC: NEGATIVE MG/DL
PROT UR STRIP-MCNC: ABNORMAL MG/DL

## 2025-01-03 NOTE — PROGRESS NOTES
Entirety of today's encounter including patient interview, exam, and counseling performed with the aid of a medical  via the telephone.     Chief complaint: Patient here for routine OB visit.    History of present illness: No major complaints at this time.  She denies contractions, vaginal bleeding, leakage of fluid.  She reports active fetal movement.    Objective: See vital signs in the flowsheet  General: No acute distress, awake and oriented x3  Abdomen: Soft, nontender, gravid, fetal heart tones 156, fundal height 32 cm  Extremities: No lower extremity edema, no calf tenderness  Psychiatric: Good judgment insight, normal affect and mood  Neurologic: Cranial nerves II through XII intact, no gross deficits    Ultrasound today:  Appropriate growth for gestational age with the estimated fetal weight at the 25th percentile and the abdominal circumference at the 14th percentile  Normal amniotic fluid.  Anterior placenta  Cephalic presentation    Assessment:  1.  24-year-old  2 para 0 at 32-4/7 weeks gestational age  2.  SMA carrier    Plan:  1.  Ultrasound findings in today were discussed with patient.  Limitations of ultrasound were explained.  2.  Otherwise doing well.  Routine prenatal care.  Return to the office in 2 weeks.

## 2025-01-15 ENCOUNTER — ROUTINE PRENATAL (OUTPATIENT)
Dept: OBSTETRICS AND GYNECOLOGY | Facility: CLINIC | Age: 25
End: 2025-01-15
Payer: COMMERCIAL

## 2025-01-15 VITALS — DIASTOLIC BLOOD PRESSURE: 67 MMHG | SYSTOLIC BLOOD PRESSURE: 102 MMHG | WEIGHT: 150 LBS

## 2025-01-15 DIAGNOSIS — Z34.93 PRENATAL CARE IN THIRD TRIMESTER: ICD-10-CM

## 2025-01-15 DIAGNOSIS — Z3A.34 34 WEEKS GESTATION OF PREGNANCY: Primary | ICD-10-CM

## 2025-01-15 DIAGNOSIS — Z15.89 MONOALLELIC MUTATION OF SMN1 GENE: ICD-10-CM

## 2025-01-15 LAB
GLUCOSE UR STRIP-MCNC: NEGATIVE MG/DL
PROT UR STRIP-MCNC: ABNORMAL MG/DL

## 2025-01-15 NOTE — PROGRESS NOTES
Chief Complaint   Patient presents with    Routine Prenatal Visit     OB follow up     Koko Aguilar is a 24 y.o.  34w2d being seen today for her obstetrical visit.  Patient reports no complaints. Fetal movement: normal.  used for this visit.     Review of Systems  Cramping/contractions: denies  Vaginal bleeding: denies  Fetal movement: normal    /67   Wt 68 kg (150 lb)   LMP 2024 (Approximate)   Prenatal Assessment  Fetal Heart Rate: 160  Movement: Present  Edema  LLE Edema: Trace  RLE Edema: Trace  Facial Edema: None    Assessment/Plan    Diagnoses and all orders for this visit:    1. 34 weeks gestation of pregnancy (Primary)  -     POC Urinalysis Dipstick    2. Prenatal care in third trimester    3. Monoallelic mutation of SMN1 gene    Other orders  -     ABRYSVO RSV Vaccine (Adults 60+, pregnant women 32-36 wks)       Reviewed fetal kick counts  Reviewed S&S PTL  RSV vaccine today  GBS at next visit  Encouraged to select pediatrician   Discussed location of hospital  Reviewed this stage of pregnancy  Problem list updated     Follow up in 2 week(s) with Dr. Jacobson    I spent 20 minutes caring for Koko on this date of service. This time includes time spent by me in the following activities: preparing for the visit, reviewing tests, performing a medically appropriate examination and/or evaluation, counseling and educating the patient/family/caregiver, referring and communicating with other health care professionals, documenting information in the medical record, independently interpreting results and communicating that information with the patient/family/caregiver, care coordination, ordering test(s), obtaining a separately obtained history, and reviewing a separately obtained history    Fariba Salinas, APRN  1/15/2025  11:52 EST

## 2025-01-31 ENCOUNTER — ROUTINE PRENATAL (OUTPATIENT)
Dept: OBSTETRICS AND GYNECOLOGY | Facility: CLINIC | Age: 25
End: 2025-01-31
Payer: COMMERCIAL

## 2025-01-31 VITALS — DIASTOLIC BLOOD PRESSURE: 71 MMHG | WEIGHT: 154.8 LBS | SYSTOLIC BLOOD PRESSURE: 114 MMHG

## 2025-01-31 DIAGNOSIS — Z3A.36 36 WEEKS GESTATION OF PREGNANCY: Primary | ICD-10-CM

## 2025-01-31 DIAGNOSIS — O09.899 SUPERVISION OF OTHER HIGH RISK PREGNANCY, ANTEPARTUM: ICD-10-CM

## 2025-01-31 DIAGNOSIS — Z15.89 MONOALLELIC MUTATION OF SMN1 GENE: ICD-10-CM

## 2025-01-31 LAB
GLUCOSE UR STRIP-MCNC: NEGATIVE MG/DL
PROT UR STRIP-MCNC: ABNORMAL MG/DL

## 2025-01-31 NOTE — PROGRESS NOTES
Entirety of today's encounter including patient interview, exam, and counseling performed with the aid of a medical  via the telephone.     Chief complaint: Prenatal visit  History of present illness: Patient is here for her routine prenatal visit.  She is without major complaints at this time.  She reports active fetal movement.  She denies contractions.  She does report for the past 3-4 days having the passage of small amounts of fluid.  She says she is unclear if it is urine.  It has not been particularly heavy or enough to wet her pants.    Objective: See vital signs in the flowsheet  General: No acute distress, awake and oriented x3  Abdomen: Soft, nontender, gravid, fetal heart tones 140, fundal height 36 cm  Pelvic exam performed in the presence of a female chaperone.  Patient provided verbal consent to proceed with exam today.  Normal external female genitalia, no lesions  Sterile speculum exam performed: There is no significant leakage of fluid.  There is no pooling.  Nitrazine is negative.  Valsalva is negative.  No vaginal discharge or bleeding  Cervix: 2 cm dilated/90% effaced/-1; cephalic  Extremities: No lower extremity edema, no calf tenderness    Assessment:  1.  24-year-old  2 para 0 at 36-4/7 weeks gestational age  2.  SMA carrier    Plan:  1.  No evidence of leakage of amniotic fluid today.  Labor signs discussed with the patient at length.  Also explained the patient to go to  if she has the need to go to the hospital.  Address and directions to the hospital provided.  2.  GBS performed today.  3.  Return to the office in 1 week.

## 2025-02-02 LAB — GP B STREP DNA SPEC QL NAA+PROBE: NEGATIVE

## 2025-02-07 ENCOUNTER — ROUTINE PRENATAL (OUTPATIENT)
Dept: OBSTETRICS AND GYNECOLOGY | Facility: CLINIC | Age: 25
End: 2025-02-07
Payer: COMMERCIAL

## 2025-02-07 VITALS — DIASTOLIC BLOOD PRESSURE: 71 MMHG | WEIGHT: 157.2 LBS | SYSTOLIC BLOOD PRESSURE: 126 MMHG

## 2025-02-07 DIAGNOSIS — O09.899 SUPERVISION OF OTHER HIGH RISK PREGNANCY, ANTEPARTUM: ICD-10-CM

## 2025-02-07 DIAGNOSIS — Z15.89 MONOALLELIC MUTATION OF SMN1 GENE: ICD-10-CM

## 2025-02-07 DIAGNOSIS — Z3A.37 37 WEEKS GESTATION OF PREGNANCY: Primary | ICD-10-CM

## 2025-02-07 DIAGNOSIS — R82.90 MALODOROUS URINE: ICD-10-CM

## 2025-02-07 LAB
GLUCOSE UR STRIP-MCNC: NEGATIVE MG/DL
PROT UR STRIP-MCNC: NEGATIVE MG/DL

## 2025-02-07 NOTE — PROGRESS NOTES
Entirety of today's encounter including patient interview, exam, and counseling performed with the aid of a medical  via the telephone.     Chief complaint: Prenatal visit  History of present illness: Patient is here for her routine prenatal visit.  She is without major complaints at this time.  She denies vaginal bleeding, leakage of fluid.  She reports active fetal movement.  Denies contractions.    She does report malodorous urine, but denies urinary discomfort, frequency, or urgency.    Objective: See vital signs in the flowsheet  General: No acute distress, awake and oriented x3  Abdomen: Soft, nontender, gravid, fetal heart tones 140, fundal height 36 cm  Pelvic exam performed in the presence of a female chaperone.  Patient provided verbal consent to proceed with exam today.  Normal external female genitalia, no lesions  No vaginal discharge or bleeding  Cervix: 3 cm / 90% effaced/-1; cephalic  Extremities: No lower extremity edema, no calf tenderness    Assessment:  1 24-year-old  2 para 0 at 37-4/7 weeks gestational age  2. SMA carrier  3. Malodorous urine      Plan:  1.  Labor signs discussed with the patient at length.  Also explained the patient to go to [facility] if she has the need to go to the hospital.  Address and directions to the hospital provided.  2.  WIll send urine culture arnulfo  3.  Return to the office in 1 week.

## 2025-02-09 LAB
BACTERIA UR CULT: NO GROWTH
BACTERIA UR CULT: NORMAL

## 2025-02-12 ENCOUNTER — ROUTINE PRENATAL (OUTPATIENT)
Dept: OBSTETRICS AND GYNECOLOGY | Facility: CLINIC | Age: 25
End: 2025-02-12
Payer: COMMERCIAL

## 2025-02-12 VITALS — SYSTOLIC BLOOD PRESSURE: 102 MMHG | WEIGHT: 158 LBS | DIASTOLIC BLOOD PRESSURE: 70 MMHG

## 2025-02-12 DIAGNOSIS — Z3A.38 38 WEEKS GESTATION OF PREGNANCY: Primary | ICD-10-CM

## 2025-02-12 LAB
GLUCOSE UR STRIP-MCNC: NEGATIVE MG/DL
PROT UR STRIP-MCNC: ABNORMAL MG/DL

## 2025-02-12 NOTE — LETTER
25    SCHEDULING FORM  C-SECTIONS/INDUCTIONS    Patient:  Koko Aguilar :  2000    Phone: [phone] (home)     OB provider:  Bruno Jacobson MD    Summary:  24 y.o. female     Type of Delivery:  Induction   Priority:  Elective    Desired Date: 25      Time: 0700    EDC Estimated Date of Delivery: 25  Cervical exam:  / /        Grady Score: Total: 9   Induction agent: Pitocin    Gestational age at Desired date of Induction or CS: 39 weeks 6 days  ----------------------------------------------------------------------------  By ACOG Guidelines, women should be 39 weeks or greater before initiating an elective induction (non-medically indicated) delivery     Maternal Indications:        Fetal/Placental Conditions:    ----------------------------------------------------------------------------  For patients less than 39 weeks with indications not included in the above list, [facility] consult is required prior to scheduling.    [facility] Approved indication:   Date of consult:      Additional considerations for induction priority:      I attest that the above entries are accurate to the best of my knowledge:    Bruno Jacobson MD  2025  11:01 EST

## 2025-02-12 NOTE — PROGRESS NOTES
Entirety of today's encounter including patient interview, exam, and counseling performed with the aid of a medical  via the telephone.     Chief complaint: Prenatal visit  History of present illness: Patient is here for her routine prenatal visit.  She is without major complaints at this time.  She denies vaginal bleeding, leakage of fluid.  She reports active fetal movement.  She does report some occasional mild contractions, but nothing regular.    Objective: See vital signs in the flowsheet  General: No acute distress, awake and oriented x3  Abdomen: Soft, nontender, gravid, fetal heart tones 140, fundal height 36 cm  Pelvic exam deferred  Extremities: No lower extremity edema, no calf tenderness    Assessment:  1.  24 yop  at 38-2/7 weeks gestational age  2.  SMA carrier    Plan:  1.  Labor signs discussed with the patient at length.  Also explained the patient to go to [facility] if she has the need to go to the hospital.  Address and directions to the hospital provided.  2.  We discussed plans for spontaneous labor versus labor induction at 39+ weeks of gestation.  Risk and benefits discussed.  Patient reports she prefers spontaneous labor up until around the due date.  I think this is a good plan.  Will see her back next week.  If undelivered by next week can look into induction around   3.  Return to the office in 1 week.